# Patient Record
Sex: MALE | Race: BLACK OR AFRICAN AMERICAN | NOT HISPANIC OR LATINO | Employment: OTHER | URBAN - METROPOLITAN AREA
[De-identification: names, ages, dates, MRNs, and addresses within clinical notes are randomized per-mention and may not be internally consistent; named-entity substitution may affect disease eponyms.]

---
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
## 2022-11-06 ENCOUNTER — HOSPITAL ENCOUNTER (EMERGENCY)
Facility: HOSPITAL | Age: 22
Discharge: HOME | End: 2022-11-06
Attending: EMERGENCY MEDICINE
Payer: COMMERCIAL

## 2022-11-06 VITALS
WEIGHT: 196 LBS | OXYGEN SATURATION: 99 % | DIASTOLIC BLOOD PRESSURE: 76 MMHG | RESPIRATION RATE: 20 BRPM | TEMPERATURE: 99 F | SYSTOLIC BLOOD PRESSURE: 121 MMHG | HEART RATE: 94 BPM

## 2022-11-06 DIAGNOSIS — M54.2 ANTERIOR NECK PAIN: Primary | ICD-10-CM

## 2022-11-06 DIAGNOSIS — J06.9 VIRAL URI WITH COUGH: ICD-10-CM

## 2022-11-06 DIAGNOSIS — Z72.0 TOBACCO ABUSE: ICD-10-CM

## 2022-11-06 LAB
CTP QC/QA: YES
CTP QC/QA: YES
GROUP A STREP, MOLECULAR: NEGATIVE
HCV AB SERPL QL IA: NEGATIVE
HEP C VIRUS HOLD SPECIMEN: NORMAL
HIV 1+2 AB+HIV1 P24 AG SERPL QL IA: NEGATIVE
POC MOLECULAR INFLUENZA A AGN: NEGATIVE
POC MOLECULAR INFLUENZA B AGN: NEGATIVE
SARS-COV-2 RDRP RESP QL NAA+PROBE: NEGATIVE

## 2022-11-06 PROCEDURE — 86803 HEPATITIS C AB TEST: CPT | Performed by: EMERGENCY MEDICINE

## 2022-11-06 PROCEDURE — 87651 STREP A DNA AMP PROBE: CPT | Mod: ER | Performed by: EMERGENCY MEDICINE

## 2022-11-06 PROCEDURE — 87635 SARS-COV-2 COVID-19 AMP PRB: CPT | Mod: ER | Performed by: EMERGENCY MEDICINE

## 2022-11-06 PROCEDURE — 87389 HIV-1 AG W/HIV-1&-2 AB AG IA: CPT | Performed by: EMERGENCY MEDICINE

## 2022-11-06 PROCEDURE — 99283 EMERGENCY DEPT VISIT LOW MDM: CPT | Mod: 25,ER

## 2022-11-06 RX ORDER — PHENYLPROPANOLAMINE/CLEMASTINE 75-1.34MG
400 TABLET, EXTENDED RELEASE ORAL EVERY 6 HOURS PRN
Qty: 80 EACH | Refills: 0 | Status: SHIPPED | OUTPATIENT
Start: 2022-11-06 | End: 2022-11-16

## 2022-11-06 NOTE — ED PROVIDER NOTES
History     Chief Complaint   Patient presents with    Sore Throat     States hurts to swallow and pain to left side of throat/neck that started 3 days ago. States low grade fever.        Review of patient's allergies indicates:  No Known Allergies    History of Present Illness   HPI    11/6/2022, 8:41 AM  The history is provided by the patient    Dimas Odom is a 22 y.o. male presenting to the ED for pain to the left side of his neck.  Onset was Thursday.  Associated symptoms low-grade fever of 99.6, and a nonproductive cough.  Patient denies any rhinorrhea, dental pain, ear pain, chest pain, chest pressure, shortness of breath, voice changes, or drooling.  No rectal pain.  Pain is rated 7/10.  Patient occasionally uses a cigar.  No snuff.      Arrival mode:  Personal Vehicle    PCP: Hayley Hendrix MD     Allergies:  Review of patient's allergies indicates:  No Known Allergies    Past Medical History:  Past Medical History:   Diagnosis Date    Hypercholesterolemia        Past Surgical History:  Past Surgical History:   Procedure Laterality Date    NO PAST SURGERIES           Family History:  History reviewed. No pertinent family history.    Social History:  Social History     Tobacco Use    Smoking status: Every Day     Types: Cigarettes    Smokeless tobacco: Never   Substance and Sexual Activity    Alcohol use: Yes    Drug use: No    Sexual activity: Never        Review of Systems   Review of Systems   Constitutional:  Positive for fever.   HENT:  Negative for sore throat.    Respiratory:  Negative for shortness of breath.    Cardiovascular:  Negative for chest pain.   Gastrointestinal:  Negative for abdominal pain, nausea and vomiting.        Denies rectal pain.   Genitourinary:  Negative for dysuria.   Musculoskeletal:  Positive for neck pain (Left anterior). Negative for back pain.   Skin:  Negative for rash.   Neurological:  Negative for weakness.   Hematological:  Does not bruise/bleed easily.        Physical  Exam     Initial Vitals [11/06/22 0831]   BP Pulse Resp Temp SpO2   121/76 94 20 98.7 °F (37.1 °C) 99 %      MAP       --          Physical Exam    Nursing Notes and Vital Signs Reviewed.  Constitutional: Patient is in no apparent distress. Well-developed and well-nourished.  Sitting on [facility].  No acute distress.  Head: Atraumatic. Normocephalic.  Eyes: PERRL. EOM intact. Conjunctivae are not pale. No scleral icterus.  ENT: Mucous membranes are moist. Oropharynx is clear and symmetric.  TMs are pearly gray bilaterally.  Good dentition.  No trismus.  Uvula is midline.  There is slight erythema noted to the posterior pharyngeal region.  No ulcers.  No submental swelling.  Neck: Supple. Full ROM. No lymphadenopathy.  No thyromegaly.  No meningismus.  No swelling of the neck appreciated.  No rash.  Cardiovascular: Regular rate. Regular rhythm. No murmurs, rubs, or gallops. Distal pulses are 2+ and symmetric.  Pulmonary/Chest: No respiratory distress. Clear to auscultation bilaterally. No wheezing or rales.  Musculoskeletal: Moves all extremities. No obvious deformities. No edema. No calf tenderness.  Skin: Warm and dry.  Neurological:  Alert, awake, and appropriate.  Normal speech.  No acute focal neurological deficits are appreciated.  Cranial nerves 2-12 intact.  No focal deficits.  Psychiatric: Normal affect. Good eye contact. Appropriate in content.     ED Course     ED Procedures:  Procedures    ED Vital Signs:  Vitals:    11/06/22 0831   BP: 121/76   Pulse: 94   Resp: 20   Temp: 98.7 °F (37.1 °C)   TempSrc: Oral   SpO2: 99%   Weight: 88.9 kg (195 lb 15.8 oz)       Abnormal Lab Results:  Labs Reviewed   GROUP A STREP, MOLECULAR    Narrative:     Order this one   HIV 1 / 2 ANTIBODY   HEPATITIS C ANTIBODY   HEP C VIRUS HOLD SPECIMEN   POCT INFLUENZA A/B MOLECULAR   SARS-COV-2 RDRP GENE        All Lab Results:  Results for orders placed or performed during the hospital encounter of 11/06/22   Group A Strep,  Molecular    Specimen: Throat   Result Value Ref Range    Group A Strep, Molecular Negative Negative   POCT Influenza A/B Molecular   Result Value Ref Range    POC Molecular Influenza A Ag Negative Negative, Not Reported    POC Molecular Influenza B Ag Negative Negative, Not Reported     Acceptable Yes    POCT COVID-19 Rapid Screening   Result Value Ref Range    POC Rapid COVID Negative Negative     Acceptable Yes              Imaging Results:  Imaging Results    None               The Emergency Provider reviewed the vital signs and test results, which are outlined above.     ED Discussion     ED Course as of 11/06/22 0952   Sun Nov 06, 2022   0913 Patient advised should there be a progression of symptoms such as drooling, difficulty swallowing, swelling of the neck, fever, worsening pain he should return to emergency department for re-evaluation.  Patient also advised of the risks of tobacco use. [LB]      ED Course User Index  [LB] Elisabet Sawyer, DO            9:52 AM  Reassessment: Dr. Sawyer reassessed the pt.  The pt is resting comfortably and is NAD.  Pt states their sx have improved. Discussed test results, shared treatment plan, specific conditions for return, and the need for f/u.  Answered their questions at this time.  Pt understands and agrees to the plan.  The pt has remained hemodynamically stable through ED course and is stable for discharge.      I discussed with patient and/or family/caretaker that evaluation in the ED does not suggest any emergent or life threatening medical conditions requiring immediate intervention beyond what was provided in the ED, and I believe patient is safe for discharge.  Regardless, an unremarkable evaluation in the ED does not preclude the development or presence of a serious of life threatening condition. As such, patient was instructed to return immediately for any worsening or change in current symptoms.      ED  "Medication(s):  Medications - No data to display          MIPS Measures     Smoker? Yes     Hypertension: None         Medical Decision Making           Additional MDM:   Smoking Cessation: Appropriate patient literature was given to the patient concerning tobacco cessation. The patient was counseled on tobacco related  health complications.      MDM  Reviewed: nursing note and vitals  Interpretation: labs        Portions of this note may have been created with voice recognition software. Occasional "wrong-word" or "sound-a-like" substitutions may have occurred due to the inherent limitations of voice recognition software. Please, read the note carefully and recognize, using context, where substitutions have occurred.            Clinical Impression       ICD-10-CM ICD-9-CM   1. Anterior neck pain, left  M54.2 723.1   2. Viral URI with cough  J06.9 465.9         ED Disposition       Disposition: Discharge to home  Patient condition: Stable    Medication List     Medication List      You have not been prescribed any medications.         ED Follow-up   Follow-up Information       Hayley Hendrix MD In 2 days.    Specialty: Pediatrics  Why: Return to emergency department for:  Fever, difficulty swallowing, drooling, difficulty opening mouth, swelling of the neck, rash, or worsening condition.  Contact information:  [address] [facility] [address]  [facility] [id number] [phone]                                      Elisabet Sawyer,   11/06/22 1551    "

## 2023-06-27 ENCOUNTER — PATIENT MESSAGE (OUTPATIENT)
Dept: RESEARCH | Facility: HOSPITAL | Age: 23
End: 2023-06-27
Payer: MEDICAID